# Patient Record
Sex: MALE | Employment: FULL TIME | URBAN - METROPOLITAN AREA
[De-identification: names, ages, dates, MRNs, and addresses within clinical notes are randomized per-mention and may not be internally consistent; named-entity substitution may affect disease eponyms.]

---

## 2021-05-08 ENCOUNTER — OFFICE VISIT (OUTPATIENT)
Dept: URGENT CARE | Facility: CLINIC | Age: 61
End: 2021-05-08
Payer: COMMERCIAL

## 2021-05-08 VITALS
WEIGHT: 157 LBS | HEART RATE: 100 BPM | SYSTOLIC BLOOD PRESSURE: 110 MMHG | OXYGEN SATURATION: 98 % | HEIGHT: 66 IN | RESPIRATION RATE: 14 BRPM | BODY MASS INDEX: 25.23 KG/M2 | TEMPERATURE: 98.4 F | DIASTOLIC BLOOD PRESSURE: 80 MMHG

## 2021-05-08 DIAGNOSIS — J02.0 STREP PHARYNGITIS: Primary | ICD-10-CM

## 2021-05-08 DIAGNOSIS — J02.9 SORE THROAT: ICD-10-CM

## 2021-05-08 LAB
HETEROPH AB SER QL LA: NORMAL
INT CON NEG: NORMAL
INT CON NEG: NORMAL
INT CON POS: NORMAL
INT CON POS: NORMAL
S PYO AG THROAT QL: POSITIVE

## 2021-05-08 PROCEDURE — 86308 HETEROPHILE ANTIBODY SCREEN: CPT | Performed by: FAMILY MEDICINE

## 2021-05-08 PROCEDURE — 87880 STREP A ASSAY W/OPTIC: CPT | Performed by: FAMILY MEDICINE

## 2021-05-08 PROCEDURE — 99203 OFFICE O/P NEW LOW 30 MIN: CPT | Performed by: FAMILY MEDICINE

## 2021-05-08 RX ORDER — NICOTINE 21 MG/24HR
PATCH, TRANSDERMAL 24 HOURS TRANSDERMAL
COMMUNITY
Start: 2021-03-01 | End: 2021-05-08

## 2021-05-08 RX ORDER — NICOTINE 21 MG/24HR
PATCH, TRANSDERMAL 24 HOURS TRANSDERMAL
COMMUNITY
Start: 2020-01-24 | End: 2021-05-08

## 2021-05-08 RX ORDER — ALBUTEROL SULFATE 90 UG/1
2 AEROSOL, METERED RESPIRATORY (INHALATION)
COMMUNITY
Start: 2021-02-24 | End: 2021-05-08

## 2021-05-08 RX ORDER — ALBUTEROL SULFATE 90 UG/1
AEROSOL, METERED RESPIRATORY (INHALATION)
COMMUNITY
Start: 2021-02-27 | End: 2021-05-08

## 2021-05-08 RX ORDER — BUPROPION HYDROCHLORIDE 150 MG/1
TABLET, EXTENDED RELEASE ORAL
COMMUNITY
Start: 2020-01-24 | End: 2021-05-08

## 2021-05-08 RX ORDER — PENICILLIN V POTASSIUM 500 MG/1
500 TABLET ORAL 2 TIMES DAILY
Qty: 20 TABLET | Refills: 0 | Status: SHIPPED | OUTPATIENT
Start: 2021-05-08 | End: 2021-05-18

## 2021-05-08 NOTE — PROGRESS NOTES
"Subjective:      Josh Senior is a 60 y.o. male who presents with Neck Pain (Onset Tues/Wed, right side of ear/neck swollen. Hurts to swallow. H/O tonsils removed. Tx: Advil, Tylenol and Amxocillin (old rx).)            This is a new problem.  52-year-old presenting for evaluation of mild sore throat but also swelling the right side of the neck for the past 4 days.  It was more swollen and sore but started to get slightly better after he started taking leftover 500 mg amoxicillin he had since yesterday.  Denies any fever chills.  Denies any trouble swallowing but it hurts when he swallows.  No Strep or mono exposure.  Review of systems otherwise negative      ROS       Objective:     /80 (BP Location: Left arm, Patient Position: Sitting, BP Cuff Size: Adult)   Pulse 100   Temp 36.9 °C (98.4 °F) (Temporal)   Resp 14   Ht 1.676 m (5' 6\")   Wt 71.2 kg (157 lb)   SpO2 98%   BMI 25.34 kg/m²      Physical Exam  Constitutional:       General: He is not in acute distress.     Appearance: He is not ill-appearing, toxic-appearing or diaphoretic.   HENT:      Head: Normocephalic and atraumatic.      Right Ear: External ear normal.      Left Ear: External ear normal.      Nose: Nose normal.      Mouth/Throat:      Mouth: Mucous membranes are moist.      Pharynx: Uvula midline. Posterior oropharyngeal erythema present. No pharyngeal swelling, oropharyngeal exudate or uvula swelling.      Tonsils: No tonsillar exudate or tonsillar abscesses.   Eyes:      Conjunctiva/sclera: Conjunctivae normal.   Cardiovascular:      Rate and Rhythm: Normal rate and regular rhythm.      Heart sounds: No murmur. No friction rub. No gallop.    Pulmonary:      Effort: Pulmonary effort is normal. No respiratory distress.      Breath sounds: No stridor. No wheezing, rhonchi or rales.   Musculoskeletal:      Cervical back: Neck supple.   Lymphadenopathy:      Cervical: Cervical adenopathy (Right anterior cervical adenopathy) " present.   Skin:     General: Skin is warm.      Coloration: Skin is not jaundiced or pale.   Neurological:      Mental Status: He is alert and oriented to person, place, and time.   Psychiatric:         Thought Content: Thought content normal.            Results for orders placed or performed in visit on 05/08/21   POCT Mononucleosis (mono)   Result Value Ref Range    Heterophile Screen negativ     Internal Control Positive Valid     Internal Control Negative Valid    POCT Rapid Strep A   Result Value Ref Range    Rapid Strep Screen positive     Internal Control Positive Valid     Internal Control Negative Valid           Assessment/Plan:       ASSESSMENT:PLAN:  1. Strep pharyngitis  - penicillin v potassium (VEETID) 500 MG Tab; Take 1 tablet by mouth 2 times a day for 10 days.  Dispense: 20 tablet; Refill: 0    2. Sore throat  - POCT Mononucleosis (mono)  - POCT Rapid Strep A      Continue symptomatic care  Plan per orders and instructions  Warning signs reviewed

## 2021-05-08 NOTE — PATIENT INSTRUCTIONS
Follow up if not significantly improved as expected in 2-3 days, sooner if any worsening or new symptoms    Strep Throat, Adult  Strep throat is an infection of the throat. It is caused by germs (bacteria). Strep throat is common during the cold months of the year. It mostly affects children who are 5-15 years old. However, people of all ages can get it at any time of the year.  When strep throat affects the tonsils, it is called tonsillitis. When it affects the back of the throat, it is called pharyngitis. This infection spreads from person to person through coughing, sneezing, or having close contact.  What are the causes?  This condition is caused by the Streptococcus pyogenes germ.  What increases the risk?  You are more likely to develop this condition if:  · You care for young children. Children are more likely to get strep throat and may spread it to others.  · You go to crowded places. Germs can spread easily in such places.  · You kiss or touch someone who has strep throat.  What are the signs or symptoms?  Symptoms of this condition include:  · Fever or chills.  · Redness, swelling, or pain in the tonsils or throat.  · Pain or trouble when swallowing.  · White or yellow spots on the tonsils or throat.  · Tender glands in the neck and under the jaw.  · Bad breath.  · Red rash all over the body. This is rare.  How is this treated?  This condition may be treated with:  · Medicines that kill germs (antibiotics).  · Medicines that treat pain or fever. These include:  ? Ibuprofen or acetaminophen.  ? Aspirin, only for patients who are over the age of 18.  ? Throat lozenges.  ? Throat sprays.  Follow these instructions at home:  Medicines    · Take over-the-counter and prescription medicines only as told by your doctor.  · Take your antibiotic medicine as told by your doctor. Do not stop taking the antibiotic even if you start to feel better.  Eating and drinking    · If you have trouble swallowing, eat soft  foods until your throat feels better.  · Drink enough fluid to keep your pee (urine) pale yellow.  · To help with pain, you may have:  ? Warm fluids, such as soup and tea.  ? Cold fluids, such as frozen desserts or popsicles.  General instructions  · Rinse your mouth (gargle) with a salt-water mixture 3-4 times a day or as needed. To make a salt-water mixture, dissolve ½-1 tsp (3-6 g) of salt in 1 cup (237 mL) of warm water.  · Rest as much as you can.  · Stay home from work or school until you have been taking antibiotics for 24 hours.  · Avoid smoking or being around people who smoke.  · Keep all follow-up visits as told by your doctor. This is important.  How is this prevented?    · Do not share food, drinking cups, or personal items. They can cause the germs to spread.  · Wash your hands well with soap and water. Make sure that all people in your house wash their hands well.  · Have family members tested if they have a fever or a sore throat. They may need an antibiotic if they have strep throat.  Contact a doctor if:  · You have swelling in your neck that keeps getting bigger.  · You get a rash, cough, or earache.  · You cough up a thick fluid that is green, yellow-brown, or bloody.  · You have pain that does not get better with medicine.  · Your symptoms get worse instead of getting better.  · You have a fever.  Get help right away if:  · You vomit.  · You have a very bad headache.  · Your neck hurts or feels stiff.  · You have chest pain or are short of breath.  · You have drooling, very bad throat pain, or changes in your voice.  · Your neck is swollen, or the skin gets red and tender.  · Your mouth is dry, or you are peeing less than normal.  · You keep feeling more tired or have trouble waking up.  · Your joints are red or painful.  Summary  · Strep throat is an infection of the throat. It is caused by germs (bacteria).  · This infection can spread from person to person through coughing, sneezing, or  having close contact.  · Take your medicines, including antibiotics, as told by your doctor. Do not stop taking the antibiotic even if you start to feel better.  · To prevent the spread of germs, wash your hands well with soap and water. Have others do the same. Do not share food, drinking cups, or personal items.  · Get help right away if you have a bad headache, chest pain, shortness of breath, a stiff or painful neck, or you vomit.  This information is not intended to replace advice given to you by your health care provider. Make sure you discuss any questions you have with your health care provider.  Document Released: 06/05/2009 Document Revised: 03/06/2020 Document Reviewed: 03/06/2020  [employer] Patient Education © 2020 [employer]